# Patient Record
(demographics unavailable — no encounter records)

---

## 2025-06-25 NOTE — HISTORY OF PRESENT ILLNESS
[FreeTextEntry1] : Interval HX: Last seen 7/2024 non complaint  Went to [facility] in April for dizziness and HgbA1C was 11.5% Has been out of basal insulin for a while  Quality: T2DM Severity: uncontrolled  Duration: +10 years  Modifying Factors: Insulin and oral meds  SMBG 1-3 x's a day  No meter or logs here Glucose can be 250-400s  Current FS: 334 --> ate 9 AM [employer] eggs and cheese - took 25 units Novolog  In office gave water and Humalog 20 unitts at 11:32 Repeat FS:  321  Does not want to use CGM  HgbA1C: 11.5% 4/20/2025  Current Regimen: Missed meds 1-2 times a week- night doses Metformin 1000 mg BID basaglar 25 units daily (None, ran out a few months again)  Novolog 25 units with each meal and ssi  Tried one dose of Mounjaro and threw up after frist dose  Eye Exam: a long time ago Foot Exam: + neuropathy, saw podiatrist 5/2024 Kidney Disease: none Heart Disease: none  Weight: -9 pounds since last visit  Diet: most of the time tries to watch diet  B- eggs, cereal, milk L- sometimes skips  Dinner- sandwich, cheese, soups, chicken  Snacks: pretzels with no salt  Drinks: regular soda only sometimes , water  Exercise: none Smoking:  none  HTN /80 Denies headache and blurred vision Feels well

## 2025-06-25 NOTE — ASSESSMENT
[Diabetes Foot Care] : diabetes foot care [Long Term Vascular Complications] : long term vascular complications of diabetes [Carbohydrate Consistent Diet] : carbohydrate consistent diet [Importance of Diet and Exercise] : importance of diet and exercise to improve glycemic control, achieve weight loss and improve cardiovascular health [Exercise/Effect on Glucose] : exercise/effect on glucose [Retinopathy Screening] : Patient was referred to ophthalmology for retinopathy screening [FreeTextEntry1] : T2DM  -Reviewed risk/complication of uncontrolled DM  -Increase exercise as tolerated 5 days a week x 30 mins/day -Increase dietary efforts, low carb/low sugar -Start to check FS 3-4x's a day and keep log, bring log to next appt -Repeat HgbA1C prior to next appt  -Needs to be more complaint with follow up visits and medications -Restart MFN 1000 mg BID -Restart Basaglar  25 units QHS -Continue Novolog  25 units TID AC -Could not tolerate GLP-1 -Send logs in 2 weeks -Needs diabetic eye exam  -Declines CGM use   HTN -Denies Symptoms -Restart Lisinopril 20 mg QD   HLD -Restart atorvastatin 20 mg QD   RTO in 6 weeks with NP RTO in 6 months MD